# Patient Record
Sex: FEMALE | Race: WHITE | NOT HISPANIC OR LATINO | Employment: UNEMPLOYED | URBAN - METROPOLITAN AREA
[De-identification: names, ages, dates, MRNs, and addresses within clinical notes are randomized per-mention and may not be internally consistent; named-entity substitution may affect disease eponyms.]

---

## 2017-05-09 ENCOUNTER — OFFICE VISIT (OUTPATIENT)
Dept: FAMILY MEDICINE | Age: 32
End: 2017-05-09

## 2017-05-09 VITALS
BODY MASS INDEX: 48.03 KG/M2 | WEIGHT: 261 LBS | DIASTOLIC BLOOD PRESSURE: 70 MMHG | HEIGHT: 62 IN | SYSTOLIC BLOOD PRESSURE: 140 MMHG

## 2017-05-09 DIAGNOSIS — L91.8 CUTANEOUS SKIN TAGS: Primary | ICD-10-CM

## 2017-05-09 DIAGNOSIS — R05.8 POST-VIRAL COUGH SYNDROME: ICD-10-CM

## 2017-05-09 DIAGNOSIS — Z33.1 PREGNANCY AS INCIDENTAL FINDING: ICD-10-CM

## 2017-05-09 PROCEDURE — 11200 RMVL SKIN TAGS UP TO&INC 15: CPT | Performed by: FAMILY MEDICINE

## 2017-05-09 PROCEDURE — 99213 OFFICE O/P EST LOW 20 MIN: CPT | Performed by: FAMILY MEDICINE

## 2017-05-09 RX ORDER — FENUGREEK SEED/BL.THISTLE/ANIS 340 MG
1 CAPSULE ORAL DAILY
Status: SHIPPED | COMMUNITY
Start: 2017-05-09

## 2017-05-09 RX ORDER — GLUCOSAMINE HCL 500 MG
1 TABLET ORAL DAILY
Qty: 30 TABLET | Status: SHIPPED | COMMUNITY
Start: 2017-05-09 | End: 2019-08-15 | Stop reason: ALTCHOICE

## 2019-01-01 ENCOUNTER — EXTERNAL RECORD (OUTPATIENT)
Dept: OTHER | Age: 34
End: 2019-01-01

## 2019-02-19 ENCOUNTER — TELEPHONE (OUTPATIENT)
Dept: FAMILY MEDICINE | Age: 34
End: 2019-02-19

## 2019-03-04 ENCOUNTER — LAB SERVICES (OUTPATIENT)
Dept: LAB | Age: 34
End: 2019-03-04

## 2019-03-04 ENCOUNTER — OFFICE VISIT (OUTPATIENT)
Dept: FAMILY MEDICINE | Age: 34
End: 2019-03-04

## 2019-03-04 VITALS
WEIGHT: 268 LBS | HEART RATE: 84 BPM | DIASTOLIC BLOOD PRESSURE: 82 MMHG | BODY MASS INDEX: 49.32 KG/M2 | HEIGHT: 62 IN | OXYGEN SATURATION: 98 % | RESPIRATION RATE: 20 BRPM | SYSTOLIC BLOOD PRESSURE: 138 MMHG

## 2019-03-04 DIAGNOSIS — N92.0 MENORRHAGIA WITH REGULAR CYCLE: ICD-10-CM

## 2019-03-04 DIAGNOSIS — R21 FACIAL RASH: ICD-10-CM

## 2019-03-04 DIAGNOSIS — Z00.00 LABORATORY TESTS ORDERED AS PART OF A COMPLETE PHYSICAL EXAM (CPE): ICD-10-CM

## 2019-03-04 DIAGNOSIS — M25.571 ARTHRALGIA OF RIGHT ANKLE: ICD-10-CM

## 2019-03-04 DIAGNOSIS — L65.9 ALOPECIA: ICD-10-CM

## 2019-03-04 DIAGNOSIS — Z00.00 ANNUAL PHYSICAL EXAM: Primary | ICD-10-CM

## 2019-03-04 DIAGNOSIS — R03.0 ELEVATED BLOOD-PRESSURE READING WITHOUT DIAGNOSIS OF HYPERTENSION: ICD-10-CM

## 2019-03-04 PROCEDURE — 99395 PREV VISIT EST AGE 18-39: CPT | Performed by: FAMILY MEDICINE

## 2019-03-04 PROCEDURE — 36415 COLL VENOUS BLD VENIPUNCTURE: CPT | Performed by: INTERNAL MEDICINE

## 2019-03-04 ASSESSMENT — PATIENT HEALTH QUESTIONNAIRE - PHQ9
SUM OF ALL RESPONSES TO PHQ9 QUESTIONS 1 AND 2: 2
1. LITTLE INTEREST OR PLEASURE IN DOING THINGS: SEVERAL DAYS
SUM OF ALL RESPONSES TO PHQ9 QUESTIONS 1 AND 2: 2
2. FEELING DOWN, DEPRESSED OR HOPELESS: SEVERAL DAYS

## 2019-03-05 LAB
ALBUMIN SERPL-MCNC: 4.3 G/DL (ref 3.6–5.1)
ALBUMIN/GLOB SERPL: 1.1 {RATIO} (ref 1–2.4)
ALP SERPL-CCNC: 102 UNITS/L (ref 45–117)
ALT SERPL-CCNC: 40 UNITS/L
ANA SER QL IA: NEGATIVE
ANION GAP SERPL CALC-SCNC: 10 MMOL/L (ref 10–20)
AST SERPL-CCNC: 24 UNITS/L
B BURGDOR IGG+IGM SER QL: NEGATIVE
BASOPHILS # BLD AUTO: 0.1 K/MCL (ref 0–0.3)
BASOPHILS NFR BLD AUTO: 1 %
BILIRUB SERPL-MCNC: 0.6 MG/DL (ref 0.2–1)
BUN SERPL-MCNC: 9 MG/DL (ref 6–20)
BUN/CREAT SERPL: 11 (ref 7–25)
CALCIUM SERPL-MCNC: 9.3 MG/DL (ref 8.4–10.2)
CHLORIDE SERPL-SCNC: 106 MMOL/L (ref 98–107)
CHOLEST SERPL-MCNC: 183 MG/DL
CHOLEST/HDLC SERPL: 3.2 {RATIO}
CO2 SERPL-SCNC: 26 MMOL/L (ref 21–32)
CREAT SERPL-MCNC: 0.82 MG/DL (ref 0.51–0.95)
CRP SERPL-MCNC: <0.3 MG/DL
DIFFERENTIAL METHOD BLD: ABNORMAL
EOSINOPHIL # BLD AUTO: 0.1 K/MCL (ref 0.1–0.5)
EOSINOPHIL NFR SPEC: 2 %
ERYTHROCYTE [DISTWIDTH] IN BLOOD: 13.7 % (ref 11–15)
ERYTHROCYTE [SEDIMENTATION RATE] IN BLOOD: 22 MM/HR (ref 0–20)
FASTING STATUS PATIENT QL REPORTED: 17 HRS
GLOBULIN SER-MCNC: 3.9 G/DL (ref 2–4)
GLUCOSE SERPL-MCNC: 82 MG/DL (ref 65–99)
HCT VFR BLD CALC: 37.9 % (ref 36–46.5)
HDLC SERPL-MCNC: 57 MG/DL
HGB BLD-MCNC: 11.8 G/DL (ref 12–15.5)
IMM GRANULOCYTES # BLD AUTO: 0 K/MCL (ref 0–0.2)
IMM GRANULOCYTES NFR BLD: 0 %
LDLC SERPL-MCNC: 115 MG/DL
LENGTH OF FAST TIME PATIENT: 17 HRS
LYMPHOCYTES # BLD MANUAL: 1.7 K/MCL (ref 1–4.8)
LYMPHOCYTES NFR BLD MANUAL: 29 %
MCH RBC QN AUTO: 25.6 PG (ref 26–34)
MCHC RBC AUTO-ENTMCNC: 31.1 G/DL (ref 32–36.5)
MCV RBC AUTO: 82.2 FL (ref 78–100)
MONOCYTES # BLD MANUAL: 0.4 K/MCL (ref 0.3–0.9)
MONOCYTES NFR BLD MANUAL: 7 %
NEUTROPHILS # BLD: 3.5 K/MCL (ref 1.8–7.7)
NEUTROPHILS NFR BLD AUTO: 61 %
NONHDLC SERPL-MCNC: 126 MG/DL
NRBC BLD MANUAL-RTO: 0 /100 WBC
PLATELET # BLD: 310 K/MCL (ref 140–450)
POTASSIUM SERPL-SCNC: 4.5 MMOL/L (ref 3.4–5.1)
PROT SERPL-MCNC: 8.2 G/DL (ref 6.4–8.2)
RBC # BLD: 4.61 MIL/MCL (ref 4–5.2)
RHEUMATOID FACT SERPL-ACNC: <10 UNITS/ML
SODIUM SERPL-SCNC: 138 MMOL/L (ref 135–145)
TRIGL SERPL-MCNC: 54 MG/DL
TSH SERPL-ACNC: 0.8 MCUNITS/ML (ref 0.35–5)
WBC # BLD: 5.9 K/MCL (ref 4.2–11)

## 2019-03-07 ENCOUNTER — TELEPHONE (OUTPATIENT)
Dept: FAMILY MEDICINE | Age: 34
End: 2019-03-07

## 2019-03-07 DIAGNOSIS — R21 FACIAL RASH: Primary | ICD-10-CM

## 2019-03-07 DIAGNOSIS — R03.0 ELEVATED BLOOD PRESSURE READING: ICD-10-CM

## 2019-03-08 RX ORDER — HYDROCHLOROTHIAZIDE 25 MG/1
25 TABLET ORAL DAILY
Qty: 30 TABLET | Refills: 0 | Status: SHIPPED | OUTPATIENT
Start: 2019-03-08 | End: 2019-08-15 | Stop reason: ALTCHOICE

## 2019-03-11 ENCOUNTER — TELEPHONE (OUTPATIENT)
Dept: FAMILY MEDICINE | Age: 34
End: 2019-03-11

## 2019-03-11 DIAGNOSIS — G89.29 CHRONIC NONINTRACTABLE HEADACHE, UNSPECIFIED HEADACHE TYPE: ICD-10-CM

## 2019-03-11 DIAGNOSIS — K59.00 CONSTIPATION, UNSPECIFIED CONSTIPATION TYPE: ICD-10-CM

## 2019-03-11 DIAGNOSIS — R51.9 CHRONIC NONINTRACTABLE HEADACHE, UNSPECIFIED HEADACHE TYPE: ICD-10-CM

## 2019-03-11 DIAGNOSIS — R11.2 NAUSEA AND VOMITING, INTRACTABILITY OF VOMITING NOT SPECIFIED, UNSPECIFIED VOMITING TYPE: Primary | ICD-10-CM

## 2019-03-25 ENCOUNTER — OFFICE VISIT (OUTPATIENT)
Dept: GASTROENTEROLOGY | Age: 34
End: 2019-03-25

## 2019-03-25 ENCOUNTER — HOSPITAL ENCOUNTER (OUTPATIENT)
Age: 34
End: 2019-03-25
Attending: INTERNAL MEDICINE | Admitting: INTERNAL MEDICINE

## 2019-03-25 ENCOUNTER — LAB SERVICES (OUTPATIENT)
Dept: LAB | Age: 34
End: 2019-03-25

## 2019-03-25 ENCOUNTER — PREP FOR CASE (OUTPATIENT)
Dept: GASTROENTEROLOGY | Age: 34
End: 2019-03-25

## 2019-03-25 ENCOUNTER — TELEPHONE (OUTPATIENT)
Dept: GASTROENTEROLOGY | Age: 34
End: 2019-03-25

## 2019-03-25 VITALS
DIASTOLIC BLOOD PRESSURE: 117 MMHG | HEIGHT: 62 IN | RESPIRATION RATE: 18 BRPM | WEIGHT: 273.15 LBS | BODY MASS INDEX: 50.27 KG/M2 | SYSTOLIC BLOOD PRESSURE: 165 MMHG | HEART RATE: 78 BPM

## 2019-03-25 DIAGNOSIS — R10.9 ABDOMINAL PAIN, UNSPECIFIED ABDOMINAL LOCATION: Primary | ICD-10-CM

## 2019-03-25 DIAGNOSIS — M79.10 MUSCLE PAIN: ICD-10-CM

## 2019-03-25 DIAGNOSIS — R19.7 DIARRHEA, UNSPECIFIED TYPE: ICD-10-CM

## 2019-03-25 DIAGNOSIS — M79.10 MUSCLE PAIN: Primary | ICD-10-CM

## 2019-03-25 LAB — CK SERPL-CCNC: 65 UNITS/L (ref 26–192)

## 2019-03-25 PROCEDURE — 99244 OFF/OP CNSLTJ NEW/EST MOD 40: CPT | Performed by: INTERNAL MEDICINE

## 2019-03-25 PROCEDURE — 36415 COLL VENOUS BLD VENIPUNCTURE: CPT | Performed by: INTERNAL MEDICINE

## 2019-03-25 RX ORDER — ONDANSETRON 4 MG/1
4 TABLET, FILM COATED ORAL SEE ADMIN INSTRUCTIONS
Qty: 2 TABLET | Refills: 0 | Status: SHIPPED | OUTPATIENT
Start: 2019-03-25 | End: 2019-08-15 | Stop reason: ALTCHOICE

## 2019-03-25 RX ORDER — POLYETHYLENE GLYCOL 3350, SODIUM CHLORIDE, SODIUM BICARBONATE, POTASSIUM CHLORIDE 420; 11.2; 5.72; 1.48 G/4L; G/4L; G/4L; G/4L
4000 POWDER, FOR SOLUTION ORAL ONCE
Qty: 4000 ML | Refills: 0 | Status: SHIPPED | OUTPATIENT
Start: 2019-03-25 | End: 2019-03-25

## 2019-03-28 ENCOUNTER — TELEPHONE (OUTPATIENT)
Dept: FAMILY MEDICINE | Age: 34
End: 2019-03-28

## 2019-04-01 ENCOUNTER — OFFICE VISIT (OUTPATIENT)
Dept: DERMATOLOGY | Age: 34
End: 2019-04-01
Attending: FAMILY MEDICINE

## 2019-04-01 VITALS
HEIGHT: 62 IN | HEART RATE: 98 BPM | RESPIRATION RATE: 16 BRPM | BODY MASS INDEX: 50.04 KG/M2 | WEIGHT: 271.94 LBS | OXYGEN SATURATION: 99 %

## 2019-04-01 DIAGNOSIS — L21.9 SEBORRHEIC DERMATITIS OF SCALP: Primary | ICD-10-CM

## 2019-04-01 DIAGNOSIS — L71.9 ACNE ROSACEA: ICD-10-CM

## 2019-04-01 PROCEDURE — 99202 OFFICE O/P NEW SF 15 MIN: CPT | Performed by: DERMATOLOGY

## 2019-04-01 RX ORDER — KETOCONAZOLE 20 MG/ML
SHAMPOO TOPICAL
Qty: 120 ML | Refills: 6 | Status: SHIPPED | OUTPATIENT
Start: 2019-04-01 | End: 2019-08-15 | Stop reason: ALTCHOICE

## 2019-04-01 RX ORDER — DOXYCYCLINE 50 MG/1
CAPSULE ORAL
Qty: 60 CAPSULE | Refills: 1 | Status: SHIPPED | OUTPATIENT
Start: 2019-04-01 | End: 2019-08-15 | Stop reason: ALTCHOICE

## 2019-04-03 ENCOUNTER — TELEPHONE (OUTPATIENT)
Dept: GASTROENTEROLOGY | Age: 34
End: 2019-04-03

## 2019-04-29 ENCOUNTER — OFFICE VISIT (OUTPATIENT)
Dept: DERMATOLOGY | Age: 34
End: 2019-04-29

## 2019-04-29 VITALS — OXYGEN SATURATION: 98 % | BODY MASS INDEX: 49.74 KG/M2 | HEART RATE: 62 BPM | RESPIRATION RATE: 20 BRPM | HEIGHT: 62 IN

## 2019-04-29 DIAGNOSIS — L71.9 ACNE ROSACEA: Primary | ICD-10-CM

## 2019-04-29 DIAGNOSIS — L21.9 SEBORRHEIC DERMATITIS OF SCALP: ICD-10-CM

## 2019-04-29 PROCEDURE — 99213 OFFICE O/P EST LOW 20 MIN: CPT | Performed by: DERMATOLOGY

## 2019-05-13 ENCOUNTER — TELEPHONE (OUTPATIENT)
Dept: FAMILY MEDICINE | Age: 34
End: 2019-05-13

## 2019-05-13 ENCOUNTER — OFFICE VISIT (OUTPATIENT)
Dept: FAMILY MEDICINE | Age: 34
End: 2019-05-13

## 2019-05-13 VITALS
BODY MASS INDEX: 51.34 KG/M2 | SYSTOLIC BLOOD PRESSURE: 148 MMHG | HEART RATE: 88 BPM | WEIGHT: 279 LBS | HEIGHT: 62 IN | DIASTOLIC BLOOD PRESSURE: 98 MMHG

## 2019-05-13 DIAGNOSIS — N93.9 VAGINAL BLEEDING: Primary | ICD-10-CM

## 2019-05-13 DIAGNOSIS — N92.6 ABNORMAL BLEEDING IN MENSTRUAL CYCLE: ICD-10-CM

## 2019-05-13 LAB
B-HCG UR QL: NEGATIVE
CLUE CELLS SPEC QL WET PREP: NORMAL
SP GR UR: NORMAL
T VAGINALIS SPEC QL WET PREP: NORMAL
YEAST SPEC QL WET PREP: NORMAL

## 2019-05-13 PROCEDURE — 81025 URINE PREGNANCY TEST: CPT | Performed by: NURSE PRACTITIONER

## 2019-05-13 PROCEDURE — 99213 OFFICE O/P EST LOW 20 MIN: CPT | Performed by: NURSE PRACTITIONER

## 2019-05-15 ENCOUNTER — HOSPITAL ENCOUNTER (OUTPATIENT)
Dept: ULTRASOUND IMAGING | Age: 34
Discharge: HOME | End: 2019-05-15
Attending: NURSE PRACTITIONER

## 2019-05-15 ENCOUNTER — TELEPHONE (OUTPATIENT)
Dept: FAMILY MEDICINE | Age: 34
End: 2019-05-15

## 2019-05-15 DIAGNOSIS — N92.6 ABNORMAL BLEEDING IN MENSTRUAL CYCLE: ICD-10-CM

## 2019-05-15 DIAGNOSIS — N93.9 VAGINAL BLEEDING: ICD-10-CM

## 2019-05-15 PROCEDURE — 93975 VASCULAR STUDY: CPT | Performed by: RADIOLOGY

## 2019-05-15 PROCEDURE — 76830 TRANSVAGINAL US NON-OB: CPT

## 2019-05-15 PROCEDURE — 76856 US EXAM PELVIC COMPLETE: CPT | Performed by: RADIOLOGY

## 2019-05-15 PROCEDURE — 76830 TRANSVAGINAL US NON-OB: CPT | Performed by: RADIOLOGY

## 2019-05-15 PROCEDURE — 93975 VASCULAR STUDY: CPT

## 2019-05-20 ENCOUNTER — TELEPHONE (OUTPATIENT)
Dept: FAMILY MEDICINE | Age: 34
End: 2019-05-20

## 2019-05-23 ENCOUNTER — TELEPHONE (OUTPATIENT)
Dept: FAMILY MEDICINE | Age: 34
End: 2019-05-23

## 2019-05-23 DIAGNOSIS — N93.9 VAGINAL BLEEDING: Primary | ICD-10-CM

## 2019-06-10 ENCOUNTER — OFFICE VISIT (OUTPATIENT)
Dept: OBGYN | Age: 34
End: 2019-06-10
Attending: FAMILY MEDICINE

## 2019-06-10 VITALS
BODY MASS INDEX: 51.16 KG/M2 | HEIGHT: 62 IN | WEIGHT: 278 LBS | DIASTOLIC BLOOD PRESSURE: 78 MMHG | SYSTOLIC BLOOD PRESSURE: 126 MMHG

## 2019-06-10 DIAGNOSIS — N93.9 ABNORMAL UTERINE BLEEDING: Primary | ICD-10-CM

## 2019-06-10 DIAGNOSIS — Z12.4 SCREENING FOR CERVICAL CANCER: ICD-10-CM

## 2019-06-10 PROCEDURE — 58100 BIOPSY OF UTERUS LINING: CPT | Performed by: OBSTETRICS & GYNECOLOGY

## 2019-06-10 PROCEDURE — 99204 OFFICE O/P NEW MOD 45 MIN: CPT | Performed by: OBSTETRICS & GYNECOLOGY

## 2019-06-10 PROCEDURE — 81025 URINE PREGNANCY TEST: CPT | Performed by: OBSTETRICS & GYNECOLOGY

## 2019-06-12 ENCOUNTER — TELEPHONE (OUTPATIENT)
Dept: OBGYN | Age: 34
End: 2019-06-12

## 2019-06-12 LAB — PATHOLOGIST NAME: NORMAL

## 2019-06-18 LAB — CYTOLOGY CVX/VAG DOC THIN PREP: NORMAL

## 2019-07-01 ENCOUNTER — OFFICE VISIT (OUTPATIENT)
Dept: OBGYN | Age: 34
End: 2019-07-01
Attending: FAMILY MEDICINE

## 2019-07-01 VITALS
DIASTOLIC BLOOD PRESSURE: 72 MMHG | BODY MASS INDEX: 50.97 KG/M2 | HEIGHT: 62 IN | SYSTOLIC BLOOD PRESSURE: 122 MMHG | WEIGHT: 277 LBS

## 2019-07-01 DIAGNOSIS — N93.9 ABNORMAL UTERINE BLEEDING: Primary | ICD-10-CM

## 2019-07-01 PROCEDURE — 99214 OFFICE O/P EST MOD 30 MIN: CPT | Performed by: OBSTETRICS & GYNECOLOGY

## 2019-07-29 ENCOUNTER — OFFICE VISIT (OUTPATIENT)
Dept: OBGYN | Age: 34
End: 2019-07-29

## 2019-07-29 ENCOUNTER — TELEPHONE (OUTPATIENT)
Dept: FAMILY MEDICINE | Age: 34
End: 2019-07-29

## 2019-07-29 VITALS
HEIGHT: 62 IN | WEIGHT: 274 LBS | DIASTOLIC BLOOD PRESSURE: 78 MMHG | BODY MASS INDEX: 50.42 KG/M2 | SYSTOLIC BLOOD PRESSURE: 122 MMHG

## 2019-07-29 DIAGNOSIS — R53.83 OTHER FATIGUE: ICD-10-CM

## 2019-07-29 DIAGNOSIS — N93.9 ABNORMAL UTERINE BLEEDING: Primary | ICD-10-CM

## 2019-07-29 DIAGNOSIS — L65.9 HAIR LOSS: Primary | ICD-10-CM

## 2019-07-29 PROCEDURE — 99212 OFFICE O/P EST SF 10 MIN: CPT | Performed by: OBSTETRICS & GYNECOLOGY

## 2019-08-15 ENCOUNTER — OFFICE VISIT (OUTPATIENT)
Dept: RHEUMATOLOGY | Age: 34
End: 2019-08-15

## 2019-08-15 ENCOUNTER — LAB SERVICES (OUTPATIENT)
Dept: LAB | Age: 34
End: 2019-08-15

## 2019-08-15 DIAGNOSIS — R21 RASH: ICD-10-CM

## 2019-08-15 DIAGNOSIS — L65.9 HAIR LOSS: ICD-10-CM

## 2019-08-15 DIAGNOSIS — M12.89 TRANSIENT ARTHROPATHY, MULTIPLE SITES: Primary | ICD-10-CM

## 2019-08-15 DIAGNOSIS — M12.89 TRANSIENT ARTHROPATHY, MULTIPLE SITES: ICD-10-CM

## 2019-08-15 PROCEDURE — 99204 OFFICE O/P NEW MOD 45 MIN: CPT | Performed by: PHYSICIAN ASSISTANT

## 2019-08-15 PROCEDURE — 36415 COLL VENOUS BLD VENIPUNCTURE: CPT | Performed by: INTERNAL MEDICINE

## 2019-08-15 RX ORDER — DICLOFENAC SODIUM 75 MG/1
75 TABLET, DELAYED RELEASE ORAL 2 TIMES DAILY
Qty: 60 TABLET | Refills: 5 | Status: SHIPPED | OUTPATIENT
Start: 2019-08-15

## 2019-08-15 ASSESSMENT — PAIN SCALES - GENERAL: PAINLEVEL: 3-4

## 2019-08-16 LAB
25(OH)D3+25(OH)D2 SERPL-MCNC: 17.9 NG/ML (ref 30–100)
ALBUMIN SERPL-MCNC: 4.1 G/DL (ref 3.6–5.1)
ALP SERPL-CCNC: 100 UNITS/L (ref 45–117)
ALT SERPL-CCNC: 47 UNITS/L
AST SERPL-CCNC: 25 UNITS/L
BASOPHILS # BLD AUTO: 0.1 K/MCL (ref 0–0.3)
BASOPHILS NFR BLD AUTO: 1 %
BILIRUB CONJ SERPL-MCNC: <0.1 MG/DL (ref 0–0.2)
BILIRUB SERPL-MCNC: 0.3 MG/DL (ref 0.2–1)
CREAT SERPL-MCNC: 0.73 MG/DL (ref 0.51–0.95)
CRP SERPL-MCNC: <0.3 MG/DL
DIFFERENTIAL METHOD BLD: ABNORMAL
EOSINOPHIL # BLD AUTO: 0.2 K/MCL (ref 0.1–0.5)
EOSINOPHIL NFR SPEC: 2 %
ERYTHROCYTE [DISTWIDTH] IN BLOOD: 13.7 % (ref 11–15)
ERYTHROCYTE [SEDIMENTATION RATE] IN BLOOD: 15 MM/HR (ref 0–20)
HCT VFR BLD CALC: 38.1 % (ref 36–46.5)
HGB BLD-MCNC: 12 G/DL (ref 12–15.5)
IMM GRANULOCYTES # BLD AUTO: 0 K/MCL (ref 0–0.2)
IMM GRANULOCYTES NFR BLD: 0 %
LYMPHOCYTES # BLD MANUAL: 1.9 K/MCL (ref 1–4.8)
LYMPHOCYTES NFR BLD MANUAL: 27 %
MCH RBC QN AUTO: 26.6 PG (ref 26–34)
MCHC RBC AUTO-ENTMCNC: 31.5 G/DL (ref 32–36.5)
MCV RBC AUTO: 84.5 FL (ref 78–100)
MONOCYTES # BLD MANUAL: 0.5 K/MCL (ref 0.3–0.9)
MONOCYTES NFR BLD MANUAL: 8 %
NEUTROPHILS # BLD: 4.3 K/MCL (ref 1.8–7.7)
NEUTROPHILS NFR BLD AUTO: 62 %
NRBC BLD MANUAL-RTO: 0 /100 WBC
PLATELET # BLD: 253 K/MCL (ref 140–450)
PROT SERPL-MCNC: 8 G/DL (ref 6.4–8.2)
RBC # BLD: 4.51 MIL/MCL (ref 4–5.2)
WBC # BLD: 7 K/MCL (ref 4.2–11)

## 2019-08-21 ENCOUNTER — TELEPHONE (OUTPATIENT)
Dept: RHEUMATOLOGY | Age: 34
End: 2019-08-21

## 2019-08-23 LAB
REF LAB NAME: NORMAL
REF LAB TEST NAME: 3
REF LAB TEST RESULTS: <0.2
REFERENCE RANGE: <0.2

## 2019-09-12 ENCOUNTER — OFFICE VISIT (OUTPATIENT)
Dept: RHEUMATOLOGY | Age: 34
End: 2019-09-12

## 2019-09-12 DIAGNOSIS — E55.9 VITAMIN D DEFICIENCY: ICD-10-CM

## 2019-09-12 DIAGNOSIS — F51.01 PRIMARY INSOMNIA: ICD-10-CM

## 2019-09-12 DIAGNOSIS — M12.89 TRANSIENT ARTHROPATHY, MULTIPLE SITES: Primary | ICD-10-CM

## 2019-09-12 PROCEDURE — 99214 OFFICE O/P EST MOD 30 MIN: CPT | Performed by: PHYSICIAN ASSISTANT

## 2019-09-12 ASSESSMENT — PAIN SCALES - GENERAL: PAINLEVEL: 5-6

## 2019-10-15 ENCOUNTER — OFFICE VISIT (OUTPATIENT)
Dept: OBGYN | Age: 34
End: 2019-10-15

## 2019-10-15 VITALS
SYSTOLIC BLOOD PRESSURE: 130 MMHG | HEIGHT: 62 IN | BODY MASS INDEX: 49.71 KG/M2 | WEIGHT: 270.1 LBS | DIASTOLIC BLOOD PRESSURE: 82 MMHG

## 2019-10-15 DIAGNOSIS — N92.6 IRREGULAR MENSES: ICD-10-CM

## 2019-10-15 DIAGNOSIS — L65.9 HAIR LOSS: Primary | ICD-10-CM

## 2019-10-15 DIAGNOSIS — N83.201 CYST OF RIGHT OVARY: ICD-10-CM

## 2019-10-15 PROCEDURE — 99214 OFFICE O/P EST MOD 30 MIN: CPT | Performed by: OBSTETRICS & GYNECOLOGY

## 2019-10-19 ENCOUNTER — LAB SERVICES (OUTPATIENT)
Dept: LAB | Age: 34
End: 2019-10-19

## 2019-10-19 DIAGNOSIS — L65.9 HAIR LOSS: ICD-10-CM

## 2019-10-19 DIAGNOSIS — N92.6 IRREGULAR MENSES: ICD-10-CM

## 2019-10-19 LAB
ESTRADIOL SERPL-MCNC: 43 PG/ML
FSH SERPL-ACNC: 5.9 MUNITS/ML
LH SERPL-ACNC: 3.3 MUNITS/ML
PROGEST SERPL-MCNC: 0.26 NG/ML
PROLACTIN SERPL-MCNC: 9.9 NG/ML (ref 2.8–29.2)
T3FREE SERPL-MCNC: 2.9 PG/ML (ref 2.2–4)
T4 FREE SERPL-MCNC: 1 NG/DL (ref 0.8–1.5)
TSH SERPL-ACNC: 0.99 MCUNITS/ML (ref 0.35–5)

## 2019-10-19 PROCEDURE — 36415 COLL VENOUS BLD VENIPUNCTURE: CPT | Performed by: INTERNAL MEDICINE

## 2019-10-20 LAB
DHEA-S SERPL-MCNC: 172.1 MCG/DL (ref 8–391)
THYROGLOB AB SERPL-ACNC: 1 IU/ML (ref 0–4)
THYROPEROXIDASE AB SERPL-ACNC: <28 UNITS/ML

## 2019-10-23 LAB — TESTOST SERPL-MCNC: 27 NG/DL (ref 9–55)

## 2019-10-24 ENCOUNTER — TELEPHONE (OUTPATIENT)
Dept: OBGYN | Age: 34
End: 2019-10-24

## 2019-10-24 ENCOUNTER — TELEPHONE (OUTPATIENT)
Dept: FAMILY MEDICINE | Age: 34
End: 2019-10-24

## 2019-10-29 ENCOUNTER — OFFICE VISIT (OUTPATIENT)
Dept: FAMILY MEDICINE | Age: 34
End: 2019-10-29

## 2019-10-29 VITALS
WEIGHT: 272.2 LBS | DIASTOLIC BLOOD PRESSURE: 90 MMHG | RESPIRATION RATE: 22 BRPM | SYSTOLIC BLOOD PRESSURE: 134 MMHG | BODY MASS INDEX: 49.79 KG/M2 | HEART RATE: 78 BPM

## 2019-10-29 DIAGNOSIS — L65.9 ALOPECIA: Primary | ICD-10-CM

## 2019-10-29 DIAGNOSIS — E66.01 MORBID OBESITY WITH BMI OF 45.0-49.9, ADULT (CMD): ICD-10-CM

## 2019-10-29 DIAGNOSIS — N93.9 ABNORMAL UTERINE BLEEDING: ICD-10-CM

## 2019-10-29 DIAGNOSIS — J04.0 LARYNGITIS, ACUTE: ICD-10-CM

## 2019-10-29 DIAGNOSIS — L21.9 SEBORRHEIC DERMATITIS OF SCALP: ICD-10-CM

## 2019-10-29 PROCEDURE — 99214 OFFICE O/P EST MOD 30 MIN: CPT | Performed by: FAMILY MEDICINE

## 2020-03-12 ENCOUNTER — APPOINTMENT (OUTPATIENT)
Dept: RHEUMATOLOGY | Age: 35
End: 2020-03-12

## 2020-03-17 ENCOUNTER — APPOINTMENT (OUTPATIENT)
Dept: RHEUMATOLOGY | Age: 35
End: 2020-03-17

## 2020-03-25 ENCOUNTER — TELEPHONE (OUTPATIENT)
Dept: RHEUMATOLOGY | Age: 35
End: 2020-03-25

## 2020-03-25 DIAGNOSIS — M12.89 TRANSIENT ARTHROPATHY, MULTIPLE SITES: Primary | ICD-10-CM

## 2020-03-25 DIAGNOSIS — L65.9 HAIR LOSS: ICD-10-CM

## 2020-03-25 DIAGNOSIS — R21 RASH: ICD-10-CM

## 2020-03-27 ENCOUNTER — LAB SERVICES (OUTPATIENT)
Dept: LAB | Age: 35
End: 2020-03-27

## 2020-03-27 DIAGNOSIS — M12.89 TRANSIENT ARTHROPATHY, MULTIPLE SITES: ICD-10-CM

## 2020-03-27 DIAGNOSIS — L65.9 HAIR LOSS: ICD-10-CM

## 2020-03-27 DIAGNOSIS — R21 RASH: ICD-10-CM

## 2020-03-27 LAB
CRP SERPL-MCNC: 0.3 MG/DL
ERYTHROCYTE [SEDIMENTATION RATE] IN BLOOD BY WESTERGREN METHOD: 24 MM/HR (ref 0–20)
T3 SERPL-MCNC: 1.05 NG/ML (ref 0.6–1.81)
TSH SERPL-ACNC: 1.4 MCUNITS/ML (ref 0.35–5)

## 2020-03-27 PROCEDURE — 86225 DNA ANTIBODY NATIVE: CPT | Performed by: CLINICAL MEDICAL LABORATORY

## 2020-03-27 PROCEDURE — 84443 ASSAY THYROID STIM HORMONE: CPT | Performed by: CLINICAL MEDICAL LABORATORY

## 2020-03-27 PROCEDURE — 85652 RBC SED RATE AUTOMATED: CPT | Performed by: CLINICAL MEDICAL LABORATORY

## 2020-03-27 PROCEDURE — 84480 ASSAY TRIIODOTHYRONINE (T3): CPT | Performed by: CLINICAL MEDICAL LABORATORY

## 2020-03-27 PROCEDURE — 86235 NUCLEAR ANTIGEN ANTIBODY: CPT | Performed by: CLINICAL MEDICAL LABORATORY

## 2020-03-27 PROCEDURE — 86140 C-REACTIVE PROTEIN: CPT | Performed by: CLINICAL MEDICAL LABORATORY

## 2020-03-30 LAB
CENTROMERE AB SER-ACNC: <0.2 AI
CHROMATIN AB SERPL-ACNC: 0.4 AI
DSDNA AB SER IA-ACNC: 3 IUNITS/ML
ENA JO1 IGG SER-ACNC: <0.2 AI
ENA RNP IGG SER IA-ACNC: <0.2 AI
ENA SCL70 IGG SER IA-ACNC: <0.2 AI
ENA SM IGG SER IA-ACNC: <0.2 AI
ENA SM+RNP IGG SER IA-ACNC: <0.2 AI
ENA SS-A IGG SER IA-ACNC: <0.2 AI
ENA SS-B IGG SER IA-ACNC: <0.2 AI
RIBOSOMAL P IGG SER-ACNC: <0.2 AI

## 2020-04-07 ENCOUNTER — APPOINTMENT (OUTPATIENT)
Dept: RHEUMATOLOGY | Age: 35
End: 2020-04-07

## 2020-06-15 ENCOUNTER — HOSPITAL ENCOUNTER (OUTPATIENT)
Dept: ULTRASOUND IMAGING | Age: 35
Discharge: HOME | End: 2020-06-15
Attending: OBSTETRICS & GYNECOLOGY

## 2020-06-15 DIAGNOSIS — N83.201 CYST OF RIGHT OVARY: ICD-10-CM

## 2020-06-15 PROCEDURE — 76830 TRANSVAGINAL US NON-OB: CPT

## 2020-06-15 PROCEDURE — 76856 US EXAM PELVIC COMPLETE: CPT | Performed by: RADIOLOGY

## 2020-06-15 PROCEDURE — 76830 TRANSVAGINAL US NON-OB: CPT | Performed by: RADIOLOGY

## 2020-06-16 ENCOUNTER — TELEPHONE (OUTPATIENT)
Dept: OBGYN | Age: 35
End: 2020-06-16

## 2021-01-01 ENCOUNTER — EXTERNAL RECORD (OUTPATIENT)
Dept: OTHER | Age: 36
End: 2021-01-01

## 2021-04-07 VITALS
DIASTOLIC BLOOD PRESSURE: 89 MMHG | WEIGHT: 275.13 LBS | BODY MASS INDEX: 50.05 KG/M2 | TEMPERATURE: 98.6 F | WEIGHT: 272 LBS | SYSTOLIC BLOOD PRESSURE: 160 MMHG | HEART RATE: 77 BPM | BODY MASS INDEX: 50.63 KG/M2 | SYSTOLIC BLOOD PRESSURE: 184 MMHG | DIASTOLIC BLOOD PRESSURE: 94 MMHG | TEMPERATURE: 99.2 F | HEIGHT: 62 IN | HEIGHT: 62 IN | HEART RATE: 81 BPM

## 2021-12-28 ENCOUNTER — TELEPHONE (OUTPATIENT)
Dept: FAMILY MEDICINE | Age: 36
End: 2021-12-28

## 2024-01-16 ENCOUNTER — OFFICE VISIT (OUTPATIENT)
Age: 39
End: 2024-01-16

## 2024-01-16 VITALS
TEMPERATURE: 98.4 F | SYSTOLIC BLOOD PRESSURE: 102 MMHG | HEIGHT: 63 IN | WEIGHT: 164 LBS | OXYGEN SATURATION: 97 % | HEART RATE: 51 BPM | RESPIRATION RATE: 16 BRPM | BODY MASS INDEX: 29.06 KG/M2 | DIASTOLIC BLOOD PRESSURE: 64 MMHG

## 2024-01-16 DIAGNOSIS — B96.89 BACTERIAL SINUSITIS: Primary | ICD-10-CM

## 2024-01-16 DIAGNOSIS — J32.9 BACTERIAL SINUSITIS: Primary | ICD-10-CM

## 2024-01-16 RX ORDER — AZITHROMYCIN 250 MG/1
250 TABLET, FILM COATED ORAL SEE ADMIN INSTRUCTIONS
Qty: 6 TABLET | Refills: 0 | Status: SHIPPED | OUTPATIENT
Start: 2024-01-16 | End: 2024-01-21

## 2024-01-16 ASSESSMENT — ENCOUNTER SYMPTOMS: COUGH: 1

## 2024-01-16 NOTE — PROGRESS NOTES
Janeen Mcclure (:  1985) is a 38 y.o. female,New patient, here for evaluation of the following chief complaint(s):  Cough (Cough sinus congestion with slight throat pain for 3 days )      ASSESSMENT/PLAN:  DCough, sinus congestion and sorethroat  for three days    SUBJECTIVE/OBJECTIVE:  Cough, sinus congestion and sorethroat  for three days  Cough, sinus congestion and sorethroat  for three days      Cough            Vitals:    24 1508   BP: 102/64   Site: Right Upper Arm   Position: Sitting   Cuff Size: Large Adult   Pulse: 51   Resp: 16   Temp: 98.4 °F (36.9 °C)   TempSrc: Oral   SpO2: 97%   Weight: 74.4 kg (164 lb)   Height: 1.6 m (5' 3\")       Review of Systems   Respiratory:  Positive for cough.        Physical Exam  Constitutional:       Appearance: Normal appearance.   HENT:      Head: Normocephalic.      Ears:      Comments: Mild redness annabel TMs     Nose: Congestion present.      Mouth/Throat:      Pharynx: Posterior oropharyngeal erythema present.      Comments: Post nasal drip noted  Cardiovascular:      Rate and Rhythm: Regular rhythm.      Heart sounds: Normal heart sounds.   Pulmonary:      Breath sounds: Normal breath sounds.   Abdominal:      Palpations: Abdomen is soft.           An electronic signature was used to authenticate this note.    --MEGHANA Kwong - CNP

## 2024-01-31 ENCOUNTER — OFFICE VISIT (OUTPATIENT)
Age: 39
End: 2024-01-31

## 2024-01-31 VITALS
WEIGHT: 165 LBS | SYSTOLIC BLOOD PRESSURE: 104 MMHG | OXYGEN SATURATION: 98 % | BODY MASS INDEX: 29.23 KG/M2 | HEART RATE: 75 BPM | TEMPERATURE: 98 F | DIASTOLIC BLOOD PRESSURE: 68 MMHG

## 2024-01-31 DIAGNOSIS — B86 SCABIES: Primary | ICD-10-CM

## 2024-01-31 RX ORDER — PERMETHRIN 50 MG/G
CREAM TOPICAL
Qty: 60 G | Refills: 0 | Status: SHIPPED | OUTPATIENT
Start: 2024-01-31

## 2024-01-31 RX ORDER — METHYLPREDNISOLONE 4 MG/1
TABLET ORAL
Qty: 1 KIT | Refills: 0 | Status: SHIPPED | OUTPATIENT
Start: 2024-01-31 | End: 2024-02-06

## 2024-01-31 ASSESSMENT — ENCOUNTER SYMPTOMS
DIARRHEA: 0
EYE PAIN: 0
SORE THROAT: 0
COUGH: 0

## 2024-01-31 NOTE — PATIENT INSTRUCTIONS
Discharge medications reviewed with the patient and appropriate educational materials and side effects teaching were provided.

## 2024-01-31 NOTE — PROGRESS NOTES
Janeen Mcclure (:  1985) is a 38 y.o. female,Established patient, here for evaluation of the following chief complaint(s):  Rash (Back, stomach, scalp x 5 days/Itches, welted)      ASSESSMENT/PLAN:  Visit Diagnoses and Associated Orders       Scabies    -  Primary    permethrin (ELIMITE) 5 % cream [81709]      methylPREDNISolone (MEDROL DOSEPACK) 4 MG tablet [4991]                 Exam significant for red, raised, itchy rash.  Rx were sent and patient given printed education on scabies and encouraged to:  Increase fluids (preferably with electrolytes)  Rest  And take medications as prescribed      Follow up in the ER if symptoms persist or if symptoms worsen.    SUBJECTIVE/OBJECTIVE:  Red, raised, itchy rash noted to lower back, lower abd (belt line) bilateral hands, and behind ears.      History provided by:  Patient  Rash  This is a new problem. The current episode started in the past 7 days. The problem has been gradually worsening since onset. The rash is diffuse. The rash is characterized by dryness, itchiness, redness and scaling. It is unknown if there was an exposure to a precipitant. Pertinent negatives include no congestion, cough, diarrhea, eye pain, facial edema, fatigue, fever, joint pain or sore throat. Past treatments include nothing.       38 y.o. female presents with symptoms of   symptoms of scabies.         Vitals:    24 0912   BP: 104/68   Site: Right Upper Arm   Position: Sitting   Cuff Size: Medium Adult   Pulse: 75   Temp: 98 °F (36.7 °C)   TempSrc: Oral   SpO2: 98%   Weight: 74.8 kg (165 lb)       No results found for this visit on 24.     Physical Exam  Constitutional:       General: She is not in acute distress.     Appearance: Normal appearance. She is well-developed. She is not ill-appearing.   HENT:      Right Ear: External ear normal.      Left Ear: External ear normal.      Nose: Nose normal.      Mouth/Throat:      Lips: Pink.   Eyes:      General: Lids are

## 2024-04-29 ENCOUNTER — OFFICE VISIT (OUTPATIENT)
Age: 39
End: 2024-04-29

## 2024-04-29 VITALS
BODY MASS INDEX: 30.83 KG/M2 | TEMPERATURE: 98.1 F | HEART RATE: 76 BPM | OXYGEN SATURATION: 94 % | SYSTOLIC BLOOD PRESSURE: 105 MMHG | WEIGHT: 174 LBS | DIASTOLIC BLOOD PRESSURE: 74 MMHG | HEIGHT: 63 IN

## 2024-04-29 DIAGNOSIS — R39.9 UTI SYMPTOMS: Primary | ICD-10-CM

## 2024-04-29 LAB
BILIRUBIN, POC: NEGATIVE
BLOOD URINE, POC: ABNORMAL
CLARITY, POC: ABNORMAL
COLOR, POC: ABNORMAL
GLUCOSE URINE, POC: 100
KETONES, POC: NEGATIVE
LEUKOCYTE EST, POC: NEGATIVE
NITRITE, POC: NEGATIVE
PH, POC: 6.5
PROTEIN, POC: ABNORMAL
SPECIFIC GRAVITY, POC: 1.02
UROBILINOGEN, POC: 0.2

## 2024-04-29 RX ORDER — NITROFURANTOIN 25; 75 MG/1; MG/1
100 CAPSULE ORAL 2 TIMES DAILY
Qty: 10 CAPSULE | Refills: 0 | Status: SHIPPED | OUTPATIENT
Start: 2024-04-29 | End: 2024-05-04

## 2024-04-29 ASSESSMENT — ENCOUNTER SYMPTOMS
WHEEZING: 0
CHEST TIGHTNESS: 0
SHORTNESS OF BREATH: 0
COUGH: 0
GASTROINTESTINAL NEGATIVE: 1

## 2024-04-29 NOTE — PROGRESS NOTES
2024     Janeen Mcclure (:  1985) is a 39 y.o. female, here for evaluation of the following medical concerns:    Chief Complaint   Patient presents with    Urinary Tract Infection     Urinary urgency, dark urine, pt has been taking azo.     Having dark urine with urgency and frequency for about one week.  Has been taking Azo intermittently with some relief.  She denies back pain, vomiting or abdominal pain.      Review of Systems   Constitutional:  Negative for chills, diaphoresis, fatigue and fever.   Respiratory:  Negative for cough, chest tightness, shortness of breath and wheezing.    Cardiovascular:  Negative for chest pain and palpitations.   Gastrointestinal: Negative.    Genitourinary:  Positive for dysuria, frequency, hematuria and urgency.   Neurological:  Negative for dizziness, weakness and headaches.       Prior to Visit Medications    Medication Sig Taking? Authorizing Provider   nitrofurantoin, macrocrystal-monohydrate, (MACROBID) 100 MG capsule Take 1 capsule by mouth 2 times daily for 5 days Yes Genet Mccarthy APRN - CNP   permethrin (ELIMITE) 5 % cream Apply topically as directed  Anabela Gamble APRN - CNP        Social History     Tobacco Use    Smoking status: Never    Smokeless tobacco: Never   Substance Use Topics    Alcohol use: Never    Drug use: Never        Vitals:    24 1434   BP: 105/74   Pulse: 76   Temp: 98.1 °F (36.7 °C)   TempSrc: Oral   SpO2: 94%   Weight: 78.9 kg (174 lb)   Height: 1.6 m (5' 3\")     Estimated body mass index is 30.82 kg/m² as calculated from the following:    Height as of this encounter: 1.6 m (5' 3\").    Weight as of this encounter: 78.9 kg (174 lb).    Physical Exam  Vitals and nursing note reviewed.   Constitutional:       General: She is awake. She is not in acute distress.     Appearance: Normal appearance. She is well-developed, well-groomed and normal weight. She is not ill-appearing, toxic-appearing or diaphoretic.

## 2024-04-29 NOTE — PROGRESS NOTES
2024     Janeen Mcclure (:  1985) is a 39 y.o. female, here for evaluation of the following medical concerns:    Chief Complaint   Patient presents with    Urinary Tract Infection     Urinary urgency, dark urine, pt has been taking azo.     Symptoms started six days ago.  She is taking Azo with some relief.  Has urgency to urinate and frequency.      Review of Systems    Prior to Visit Medications    Medication Sig Taking? Authorizing Provider   permethrin (ELIMITE) 5 % cream Apply topically as directed  Anabela Gamble APRN - RIANA        Social History     Tobacco Use    Smoking status: Never    Smokeless tobacco: Never   Substance Use Topics    Alcohol use: Never    Drug use: Never        Vitals:    24 1434   BP: 105/74   Pulse: 76   Temp: 98.1 °F (36.7 °C)   TempSrc: Oral   SpO2: 94%   Weight: 78.9 kg (174 lb)   Height: 1.6 m (5' 3\")     Estimated body mass index is 30.82 kg/m² as calculated from the following:    Height as of this encounter: 1.6 m (5' 3\").    Weight as of this encounter: 78.9 kg (174 lb).    Physical Exam    ASSESSMENT/PLAN:  1. UTI symptoms  ***  - Culture, Urine      No follow-ups on file.

## 2024-05-01 LAB — BACTERIA UR CULT: NORMAL

## 2025-07-09 ENCOUNTER — OFFICE VISIT (OUTPATIENT)
Age: 40
End: 2025-07-09

## 2025-07-09 VITALS
DIASTOLIC BLOOD PRESSURE: 72 MMHG | TEMPERATURE: 98.7 F | OXYGEN SATURATION: 99 % | HEART RATE: 80 BPM | SYSTOLIC BLOOD PRESSURE: 110 MMHG

## 2025-07-09 DIAGNOSIS — N30.00 ACUTE CYSTITIS WITHOUT HEMATURIA: Primary | ICD-10-CM

## 2025-07-09 DIAGNOSIS — B95.8 STAPH INFECTION: ICD-10-CM

## 2025-07-09 DIAGNOSIS — N89.8 VAGINAL DISCHARGE: ICD-10-CM

## 2025-07-09 DIAGNOSIS — R30.0 DYSURIA: ICD-10-CM

## 2025-07-09 LAB
BILIRUBIN, POC: NORMAL
BLOOD URINE, POC: NORMAL
CLARITY, POC: CLEAR
COLOR, POC: YELLOW
GLUCOSE URINE, POC: NORMAL MG/DL
KETONES, POC: NORMAL MG/DL
LEUKOCYTE EST, POC: NORMAL
NITRITE, POC: NORMAL
PH, POC: 6
PROTEIN, POC: NORMAL MG/DL
SPECIFIC GRAVITY, POC: 1.01
UROBILINOGEN, POC: 0.2 MG/DL

## 2025-07-09 RX ORDER — CETIRIZINE HYDROCHLORIDE 10 MG/1
10 TABLET ORAL DAILY
COMMUNITY

## 2025-07-09 RX ORDER — METRONIDAZOLE 500 MG/1
500 TABLET ORAL 2 TIMES DAILY
Qty: 14 TABLET | Refills: 0 | Status: SHIPPED | OUTPATIENT
Start: 2025-07-09 | End: 2025-07-16

## 2025-07-09 RX ORDER — MUPIROCIN 2 %
OINTMENT (GRAM) TOPICAL
Qty: 30 G | Refills: 0 | Status: SHIPPED | OUTPATIENT
Start: 2025-07-09 | End: 2025-07-16

## 2025-07-09 RX ORDER — NITROFURANTOIN 25; 75 MG/1; MG/1
100 CAPSULE ORAL 2 TIMES DAILY
Qty: 10 CAPSULE | Refills: 0 | Status: SHIPPED | OUTPATIENT
Start: 2025-07-09 | End: 2025-07-14

## 2025-07-09 NOTE — PATIENT INSTRUCTIONS
Prescribed Macrobid twice daily for 5 days and Metronidazole twice daily for 7 days.  Take this medication until completed.  We will call you with your lab results if a change is needed and treatment or additional treatment is needed.  Continue to push oral fluids.  May take Tylenol, ibuprofen, over-the-counter Azo as needed for urinary discomfort.  Follow-up with primary care provider in 3 to 5 days if symptoms worsen or fail to improve.

## 2025-07-09 NOTE — PROGRESS NOTES
she is having some abdominal pain, cramping, and urinary frequency. She mentions she has history of UTI and BV. Reports she was once told if [place] ocean she should wear a tampon to to stop the water from upsetting her pH.     She mentions, she been dealing rash to her scalp for years now. Reports, it starts as little blister that pop and leave little crust.          Vitals:    07/09/25 1518   BP: 110/72   BP Site: Right Upper Arm   Patient Position: Sitting   BP Cuff Size: Medium Adult   Pulse: 80   Temp: 98.7 °F (37.1 °C)   TempSrc: Temporal   SpO2: 99%       Results for orders placed or performed in visit on 07/09/25   POCT Urinalysis no Micro   Result Value Ref Range    Color, UA Yellow     Clarity, UA Clear     Glucose, UA POC Neg mg/dL    Bilirubin, UA Neg     Ketones, UA Neg mg/dL    Spec Grav, UA 1.010     Blood, UA POC Neg     pH, UA 6.0     Protein, UA POC Neg mg/dL    Urobilinogen, UA 0.2 mg/dL    Leukocytes, UA SM     Nitrite, UA Neg          Objective   Physical Exam  Vitals and nursing note reviewed.   Constitutional:       General: She is not in acute distress.     Appearance: Normal appearance. She is well-developed. She is not ill-appearing, toxic-appearing or diaphoretic.   HENT:      Head: Normocephalic and atraumatic.   Eyes:      General: No scleral icterus.     Conjunctiva/sclera: Conjunctivae normal.   Pulmonary:      Effort: Pulmonary effort is normal. No respiratory distress.   Abdominal:      General: There is no distension.      Palpations: Abdomen is soft.      Tenderness: There is abdominal tenderness in the suprapubic area. There is no right CVA tenderness or left CVA tenderness.   Musculoskeletal:      Lumbar back: Normal.   Skin:     General: Skin is warm and dry.      Capillary Refill: Capillary refill takes less than 2 seconds.      Coloration: Skin is not jaundiced.      Findings: No rash.   Neurological:      Mental Status: She is alert and oriented to person, place,

## 2025-07-10 LAB
BV BACTERIA DNA VAG QL NAA+PROBE: DETECTED
C GLABRATA DNA VAG QL NAA+PROBE: ABNORMAL
C GLABRATA DNA VAG QL NAA+PROBE: NOT DETECTED
C KRUSEI DNA VAG QL NAA+PROBE: NOT DETECTED
CANDIDA DNA VAG QL NAA+PROBE: DETECTED
T VAGINALIS DNA VAG QL NAA+PROBE: NOT DETECTED

## 2025-07-11 LAB — BACTERIA UR CULT: NORMAL
